# Patient Record
Sex: MALE | Race: BLACK OR AFRICAN AMERICAN | Employment: UNEMPLOYED | URBAN - METROPOLITAN AREA
[De-identification: names, ages, dates, MRNs, and addresses within clinical notes are randomized per-mention and may not be internally consistent; named-entity substitution may affect disease eponyms.]

---

## 2019-05-27 ENCOUNTER — HOSPITAL ENCOUNTER (EMERGENCY)
Age: 39
Discharge: HOME | End: 2019-05-28
Attending: EMERGENCY MEDICINE
Payer: COMMERCIAL

## 2019-05-27 ENCOUNTER — APPOINTMENT (OUTPATIENT)
Dept: CT IMAGING | Age: 39
End: 2019-05-27
Payer: COMMERCIAL

## 2019-05-27 DIAGNOSIS — K57.32 DIVERTICULITIS OF COLON: Primary | ICD-10-CM

## 2019-05-27 LAB
A/G RATIO: 1.2 (ref 1.1–2.2)
ALBUMIN SERPL-MCNC: 4 G/DL (ref 3.4–5)
ALP BLD-CCNC: 79 U/L (ref 40–129)
ALT SERPL-CCNC: 13 U/L (ref 10–40)
ANION GAP SERPL CALCULATED.3IONS-SCNC: 10 MMOL/L (ref 3–16)
AST SERPL-CCNC: 18 U/L (ref 15–37)
BASOPHILS ABSOLUTE: 0.1 K/UL (ref 0–0.2)
BASOPHILS RELATIVE PERCENT: 0.8 %
BILIRUB SERPL-MCNC: 0.8 MG/DL (ref 0–1)
BILIRUBIN URINE: ABNORMAL
BLOOD, URINE: ABNORMAL
BUN BLDV-MCNC: 10 MG/DL (ref 7–20)
CALCIUM SERPL-MCNC: 9.5 MG/DL (ref 8.3–10.6)
CHLORIDE BLD-SCNC: 99 MMOL/L (ref 99–110)
CLARITY: CLEAR
CO2: 25 MMOL/L (ref 21–32)
COLOR: ABNORMAL
CREAT SERPL-MCNC: 1 MG/DL (ref 0.9–1.3)
EOSINOPHILS ABSOLUTE: 0.1 K/UL (ref 0–0.6)
EOSINOPHILS RELATIVE PERCENT: 0.8 %
EPITHELIAL CELLS, UA: 1 /HPF (ref 0–5)
GFR AFRICAN AMERICAN: >60
GFR NON-AFRICAN AMERICAN: >60
GLOBULIN: 3.4 G/DL
GLUCOSE BLD-MCNC: 130 MG/DL (ref 70–99)
GLUCOSE URINE: NEGATIVE MG/DL
HCT VFR BLD CALC: 44.9 % (ref 40.5–52.5)
HEMOGLOBIN: 15.1 G/DL (ref 13.5–17.5)
HYALINE CASTS: 4 /LPF (ref 0–8)
KETONES, URINE: 15 MG/DL
LEUKOCYTE ESTERASE, URINE: NEGATIVE
LIPASE: 10 U/L (ref 13–60)
LYMPHOCYTES ABSOLUTE: 1.3 K/UL (ref 1–5.1)
LYMPHOCYTES RELATIVE PERCENT: 12 %
MCH RBC QN AUTO: 33.2 PG (ref 26–34)
MCHC RBC AUTO-ENTMCNC: 33.7 G/DL (ref 31–36)
MCV RBC AUTO: 98.5 FL (ref 80–100)
MICROSCOPIC EXAMINATION: YES
MONOCYTES ABSOLUTE: 0.8 K/UL (ref 0–1.3)
MONOCYTES RELATIVE PERCENT: 7.7 %
NEUTROPHILS ABSOLUTE: 8.7 K/UL (ref 1.7–7.7)
NEUTROPHILS RELATIVE PERCENT: 78.7 %
NITRITE, URINE: NEGATIVE
PDW BLD-RTO: 12.8 % (ref 12.4–15.4)
PH UA: 5.5 (ref 5–8)
PLATELET # BLD: 206 K/UL (ref 135–450)
PMV BLD AUTO: 7.9 FL (ref 5–10.5)
POTASSIUM SERPL-SCNC: 3.5 MMOL/L (ref 3.5–5.1)
PROTEIN UA: ABNORMAL MG/DL
RBC # BLD: 4.56 M/UL (ref 4.2–5.9)
RBC UA: 3 /HPF (ref 0–4)
SODIUM BLD-SCNC: 134 MMOL/L (ref 136–145)
SPECIFIC GRAVITY UA: >1.03 (ref 1–1.03)
TOTAL PROTEIN: 7.4 G/DL (ref 6.4–8.2)
URINE REFLEX TO CULTURE: ABNORMAL
URINE TYPE: ABNORMAL
UROBILINOGEN, URINE: 0.2 E.U./DL
WBC # BLD: 11 K/UL (ref 4–11)
WBC UA: 1 /HPF (ref 0–5)

## 2019-05-27 PROCEDURE — 81001 URINALYSIS AUTO W/SCOPE: CPT

## 2019-05-27 PROCEDURE — 74177 CT ABD & PELVIS W/CONTRAST: CPT

## 2019-05-27 PROCEDURE — 6360000004 HC RX CONTRAST MEDICATION: Performed by: PHYSICIAN ASSISTANT

## 2019-05-27 PROCEDURE — 96365 THER/PROPH/DIAG IV INF INIT: CPT

## 2019-05-27 PROCEDURE — 6360000002 HC RX W HCPCS: Performed by: PHYSICIAN ASSISTANT

## 2019-05-27 PROCEDURE — 83690 ASSAY OF LIPASE: CPT

## 2019-05-27 PROCEDURE — 96361 HYDRATE IV INFUSION ADD-ON: CPT

## 2019-05-27 PROCEDURE — 99284 EMERGENCY DEPT VISIT MOD MDM: CPT

## 2019-05-27 PROCEDURE — 85025 COMPLETE CBC W/AUTO DIFF WBC: CPT

## 2019-05-27 PROCEDURE — 96376 TX/PRO/DX INJ SAME DRUG ADON: CPT

## 2019-05-27 PROCEDURE — 80053 COMPREHEN METABOLIC PANEL: CPT

## 2019-05-27 PROCEDURE — 2580000003 HC RX 258: Performed by: PHYSICIAN ASSISTANT

## 2019-05-27 PROCEDURE — 96375 TX/PRO/DX INJ NEW DRUG ADDON: CPT

## 2019-05-27 PROCEDURE — 2500000003 HC RX 250 WO HCPCS: Performed by: PHYSICIAN ASSISTANT

## 2019-05-27 RX ORDER — CIPROFLOXACIN 500 MG/1
500 TABLET, FILM COATED ORAL 2 TIMES DAILY
Qty: 20 TABLET | Refills: 0 | Status: SHIPPED | OUTPATIENT
Start: 2019-05-27 | End: 2019-06-06

## 2019-05-27 RX ORDER — ONDANSETRON 4 MG/1
4-8 TABLET, ORALLY DISINTEGRATING ORAL EVERY 12 HOURS PRN
Qty: 12 TABLET | Refills: 0 | Status: SHIPPED | OUTPATIENT
Start: 2019-05-27 | End: 2020-11-17

## 2019-05-27 RX ORDER — MORPHINE SULFATE 4 MG/ML
4 INJECTION, SOLUTION INTRAMUSCULAR; INTRAVENOUS ONCE
Status: COMPLETED | OUTPATIENT
Start: 2019-05-27 | End: 2019-05-27

## 2019-05-27 RX ORDER — ONDANSETRON 2 MG/ML
4 INJECTION INTRAMUSCULAR; INTRAVENOUS ONCE
Status: COMPLETED | OUTPATIENT
Start: 2019-05-27 | End: 2019-05-27

## 2019-05-27 RX ORDER — OXYCODONE HYDROCHLORIDE AND ACETAMINOPHEN 5; 325 MG/1; MG/1
2 TABLET ORAL ONCE
Status: DISCONTINUED | OUTPATIENT
Start: 2019-05-27 | End: 2019-05-27

## 2019-05-27 RX ORDER — HYDROMORPHONE HYDROCHLORIDE 1 MG/ML
1 INJECTION, SOLUTION INTRAMUSCULAR; INTRAVENOUS; SUBCUTANEOUS ONCE
Status: COMPLETED | OUTPATIENT
Start: 2019-05-27 | End: 2019-05-27

## 2019-05-27 RX ORDER — 0.9 % SODIUM CHLORIDE 0.9 %
1000 INTRAVENOUS SOLUTION INTRAVENOUS ONCE
Status: COMPLETED | OUTPATIENT
Start: 2019-05-27 | End: 2019-05-27

## 2019-05-27 RX ORDER — ONDANSETRON 2 MG/ML
INJECTION INTRAMUSCULAR; INTRAVENOUS
Status: DISCONTINUED
Start: 2019-05-27 | End: 2019-05-28 | Stop reason: HOSPADM

## 2019-05-27 RX ORDER — METRONIDAZOLE 500 MG/1
500 TABLET ORAL 2 TIMES DAILY
Qty: 14 TABLET | Refills: 0 | Status: SHIPPED | OUTPATIENT
Start: 2019-05-27 | End: 2019-06-03

## 2019-05-27 RX ORDER — OXYCODONE HYDROCHLORIDE AND ACETAMINOPHEN 5; 325 MG/1; MG/1
1-2 TABLET ORAL EVERY 6 HOURS PRN
Qty: 15 TABLET | Refills: 0 | Status: SHIPPED | OUTPATIENT
Start: 2019-05-27 | End: 2019-05-30

## 2019-05-27 RX ORDER — CIPROFLOXACIN 2 MG/ML
400 INJECTION, SOLUTION INTRAVENOUS ONCE
Status: COMPLETED | OUTPATIENT
Start: 2019-05-27 | End: 2019-05-27

## 2019-05-27 RX ADMIN — HYDROMORPHONE HYDROCHLORIDE 1 MG: 1 INJECTION, SOLUTION INTRAMUSCULAR; INTRAVENOUS; SUBCUTANEOUS at 23:01

## 2019-05-27 RX ADMIN — MORPHINE SULFATE 4 MG: 4 INJECTION INTRAVENOUS at 21:29

## 2019-05-27 RX ADMIN — METRONIDAZOLE 500 MG: 500 INJECTION, SOLUTION INTRAVENOUS at 21:31

## 2019-05-27 RX ADMIN — ONDANSETRON 4 MG: 2 INJECTION INTRAMUSCULAR; INTRAVENOUS at 21:03

## 2019-05-27 RX ADMIN — MORPHINE SULFATE 4 MG: 4 INJECTION INTRAVENOUS at 20:03

## 2019-05-27 RX ADMIN — ONDANSETRON 4 MG: 2 INJECTION INTRAMUSCULAR; INTRAVENOUS at 20:01

## 2019-05-27 RX ADMIN — IOPAMIDOL 75 ML: 755 INJECTION, SOLUTION INTRAVENOUS at 20:25

## 2019-05-27 RX ADMIN — SODIUM CHLORIDE 1000 ML: 9 INJECTION, SOLUTION INTRAVENOUS at 20:00

## 2019-05-27 RX ADMIN — CIPROFLOXACIN 400 MG: 2 INJECTION, SOLUTION INTRAVENOUS at 22:37

## 2019-05-27 ASSESSMENT — ENCOUNTER SYMPTOMS
VOMITING: 0
COUGH: 0
WHEEZING: 0
ABDOMINAL PAIN: 1
RHINORRHEA: 0
DIARRHEA: 0
NAUSEA: 1
SHORTNESS OF BREATH: 0

## 2019-05-27 ASSESSMENT — PAIN SCALES - GENERAL
PAINLEVEL_OUTOF10: 10
PAINLEVEL_OUTOF10: 10
PAINLEVEL_OUTOF10: 9
PAINLEVEL_OUTOF10: 10

## 2019-05-27 ASSESSMENT — PAIN DESCRIPTION - PROGRESSION
CLINICAL_PROGRESSION: NOT CHANGED
CLINICAL_PROGRESSION: NOT CHANGED

## 2019-05-27 ASSESSMENT — PAIN DESCRIPTION - LOCATION
LOCATION: ABDOMEN

## 2019-05-27 ASSESSMENT — PAIN DESCRIPTION - PAIN TYPE
TYPE: ACUTE PAIN

## 2019-05-27 NOTE — ED PROVIDER NOTES
[address] [facility]        Pt Name: Stefania Merida  MRN: [id number]  [place] 1980  Date of evaluation: 5/27/2019  Provider: Nelsy Jasso PA-C  PCP: Referring Not In System (Inactive)    This patient was seen and evaluated by the attending physician Dr. Mona Pringle. CHIEF COMPLAINT       Chief Complaint   Patient presents with    Abdominal Pain     abd started yesterday, patient reporting that feels like something is in his stomach stabbing him       HISTORY OF PRESENT ILLNESS   (Location/Symptom, Timing/Onset, Context/Setting, Quality, Duration, Modifying Factors, Severity)  Note limiting factors. Stefania Merida is a 46 y.o. male presents for evaluation of generalized abdominal pain that began yesterday. Patient reports the pain is constant, severe denies any known aggravating or alleviating factors. He has had some is also associated nausea but denies any vomiting or diarrhea. No urinary complaints. No chest pain or shortness of breath. No fevers or chills. No known sick contacts with similar symptoms. No history of similar symptoms. No history of any abdominal surgeries. He has no other complaints or concerns at this time. Nursing Notes were all reviewed and agreed with or any disagreements were addressed  in the HPI. REVIEW OF SYSTEMS    (2-9 systems for level 4, 10 or more for level 5)     Review of Systems   Constitutional: Negative for appetite change, chills and fever. HENT: Negative for congestion and rhinorrhea. Respiratory: Negative for cough, shortness of breath and wheezing. Cardiovascular: Negative for chest pain. Gastrointestinal: Positive for abdominal pain and nausea. Negative for diarrhea and vomiting. Genitourinary: Negative for difficulty urinating, dysuria and hematuria. Musculoskeletal: Negative for neck pain and neck stiffness. Skin: Negative for rash. Neurological: Negative for headaches. Positives and Pertinent negatives as per HPI. Except as noted abovein the ROS, all other systems were reviewed and negative. PAST MEDICAL HISTORY     Past Medical History:   Diagnosis Date    Back pain     Neisseria gonorrheae 6/13/15         SURGICAL HISTORY   History reviewed. No pertinent surgical history. CURRENTMEDICATIONS       Previous Medications    ASPIRIN 325 MG TABLET    Take 325 mg by mouth daily    IBUPROFEN (ADVIL;MOTRIN) 600 MG TABLET    Take 1 tablet by mouth every 6 hours as needed for Pain    MELOXICAM (MOBIC) 7.5 MG TABLET    Take 1 tablet by mouth daily    NAPROXEN (NAPROSYN) 500 MG TABLET    Take 1 tablet by mouth 2 times daily (with meals)         ALLERGIES     Patient has no known allergies. FAMILYHISTORY     History reviewed. No pertinent family history.        SOCIAL HISTORY       Social History     Socioeconomic History    Marital status: Single     Spouse name: None    Number of children: None    Years of education: None    Highest education level: None   Occupational History    None   Social Needs    Financial resource strain: None    Food insecurity:     Worry: None     Inability: None    Transportation needs:     Medical: None     Non-medical: None   Tobacco Use    Smoking status: Current Every Day Smoker     Packs/day: 1.00     Types: Cigarettes    Smokeless tobacco: Never Used   Substance and Sexual Activity    Alcohol use: No     Comment: Rare    Drug use: No    Sexual activity: None   Lifestyle    Physical activity:     Days per week: None     Minutes per session: None    Stress: None   Relationships    Social connections:     Talks on phone: None     Gets together: None     Attends Mandaen service: None     Active member of club or organization: None     Attends meetings of clubs or organizations: None     Relationship status: None    Intimate partner violence:     Fear of current or ex partner: None     Emotionally abused: None Physically abused: None     Forced sexual activity: None   Other Topics Concern    None   Social History Narrative    ** Merged History Encounter **            SCREENINGS             PHYSICAL EXAM    (up to 7 for level 4, 8 or more for level 5)     ED Triage Vitals [05/27/19 1910]   BP Temp Temp Source Pulse Resp SpO2 Height Weight   118/73 98.8 °F (37.1 °C) Oral 99 16 100 % -- --       Physical Exam   Constitutional: He is oriented to person, place, and time. He appears well-developed and well-nourished. HENT:   Head: Normocephalic and atraumatic. Right Ear: External ear normal.   Left Ear: External ear normal.   Nose: Nose normal.   Eyes: Right eye exhibits no discharge. Left eye exhibits no discharge. Neck: Normal range of motion. Neck supple. Cardiovascular: Normal rate and regular rhythm. No murmur heard. Pulmonary/Chest: Effort normal and breath sounds normal. No stridor. No respiratory distress. He has no wheezes. Abdominal: Soft. He exhibits no distension and no mass. There is generalized tenderness and tenderness in the right upper quadrant, right lower quadrant and epigastric area. There is guarding. There is no rebound, no tenderness at McBurney's point and negative Mendoza's sign. No hernia. Musculoskeletal: Normal range of motion. Neurological: He is alert and oriented to person, place, and time. Skin: Skin is warm and dry. He is not diaphoretic. Psychiatric: He has a normal mood and affect. His behavior is normal.   Nursing note and vitals reviewed.       DIAGNOSTIC RESULTS   LABS:    Labs Reviewed   CBC WITH AUTO DIFFERENTIAL - Abnormal; Notable for the following components:       Result Value    Neutrophils # 8.7 (*)     All other components within normal limits    Narrative:     Performed at:  [facility] [address], [place], [facility] [address]   Phone [phone]   COMPREHENSIVE METABOLIC PANEL - Abnormal; Notable for the following components: abdomen and epigastrium. No peritoneal signs. No McBurney's point tenderness or Mendoza sign. He was given IV fluids, morphine and Zofran for symptomatically relief and will be reevaluated. On reevaluation, patient was having active vomiting and continued pain. Given second dose of morphine and Zofran. CBC and CMP are unremarkable. No leukocytosis. Lipase 10. Urinalysis is negative. CT abdomen and pelvis shows moderate to severe wall thickening of the proximal mid transverse colon with adjacent inflammatory fat stranding consistent with colonic diverticulitis without evidence of perforation, free air or abscess. Patient was given Cipro and Flagyl in the ED. On reevaluation, patient still complaining of pain but was requesting discharge home at this time. He is given Percocet, prescription for this, Zofran, Cipro and Flagyl. Encouraged follow-up with PCP for reevaluation in 2-3 days. I estimate there is LOW risk for ACUTE APPENDICITIS, BOWEL OBSTRUCTION, CHOLECYSTITIS, DIVERTICULITIS, INCARCERATED HERNIA, PANCREATITIS, or PERFORATED BOWEL or ULCER, thus I consider the discharge disposition reasonable. Also, there is no evidence or peritonitis, sepsis, or toxicity. Kendall Mar and I have discussed the diagnosis and risks, and we agree with discharging home to follow-up with their primary doctor. We also discussed returning to the Emergency Department immediately if new or worsening symptoms occur. We have discussed the symptoms which are most concerning (e.g., bloody stool, fever, changing or worsening pain, vomiting) that necessitate immediate return. FINAL IMPRESSION      1.  Diverticulitis of colon          DISPOSITION/PLAN   DISPOSITION Decision To Discharge 05/27/2019 10:50:00 PM      PATIENT REFERREDTO:  Referring Not In System    Call   For a re-check in  2-3  days    [facility] Emergency Department  [address] [facility]  [phone]  Go to   If symptoms

## 2019-05-27 NOTE — LETTER
[facility] Emergency Department  [address] [facility], [address]             May 28, 2019    Patient: Maame Stanton   YOB: 1980   Date of Visit: 5/27/2019       To Whom It May Concern:    Maame Stanton was seen and treated in our emergency department on 5/27/2019. He may return to work on Thursday 5/30/2019.       Sincerely,         [facility] Emergency Dept

## 2019-05-28 VITALS
TEMPERATURE: 98.8 F | HEART RATE: 86 BPM | OXYGEN SATURATION: 96 % | DIASTOLIC BLOOD PRESSURE: 77 MMHG | RESPIRATION RATE: 16 BRPM | SYSTOLIC BLOOD PRESSURE: 119 MMHG

## 2019-05-28 ASSESSMENT — PAIN DESCRIPTION - PAIN TYPE: TYPE: ACUTE PAIN

## 2019-05-28 ASSESSMENT — PAIN DESCRIPTION - LOCATION: LOCATION: ABDOMEN

## 2019-05-28 ASSESSMENT — PAIN DESCRIPTION - PROGRESSION: CLINICAL_PROGRESSION: NOT CHANGED

## 2019-05-28 ASSESSMENT — PAIN SCALES - GENERAL: PAINLEVEL_OUTOF10: 9

## 2019-05-28 NOTE — ED NOTES
Pt returned from CT - resting with lights dimmed in room. Call bell in reach.      Dennie Jericho, RN  05/27/19 2048

## 2019-05-28 NOTE — ED NOTES
Pt presents with abdominal discomfort - stated that it feels like someone stabbing him like gas pain all over x 1 day. Pt denies any constipation or dysuria. Denies any N/V. Respirations even and unlabored. Saline lock inserted with blood drawn and sent to lab. NS infusing IV with meds given for pain. Call bell in reach.      Amaya Burleson RN  05/27/19 2008

## 2019-05-28 NOTE — ED PROVIDER NOTES
[facility] Emergency Department      Pt Name: Melissa Day  MRN: [id number]  [place] 1980  Date of evaluation: 5/27/2019  Provider: Houston Mirza MD  I independently performed a history and physical on Melissa Day. All diagnostic, treatment, and disposition decisions were made by myself in conjunction with the advanced practice provider. HPI: Melissa Day presented with   Chief Complaint   Patient presents with    Abdominal Pain     abd started yesterday, patient reporting that feels like something is in his stomach stabbing him     Melissa Day has a past medical history of Back pain and Neisseria gonorrheae (6/13/15). He has no past surgical history on file. No current facility-administered medications on file prior to encounter. Current Outpatient Medications on File Prior to Encounter   Medication Sig Dispense Refill    naproxen (NAPROSYN) 500 MG tablet Take 1 tablet by mouth 2 times daily (with meals) 30 tablet 0    aspirin 325 MG tablet Take 325 mg by mouth daily      meloxicam (MOBIC) 7.5 MG tablet Take 1 tablet by mouth daily 30 tablet 0    ibuprofen (ADVIL;MOTRIN) 600 MG tablet Take 1 tablet by mouth every 6 hours as needed for Pain 30 tablet 0     PHYSICAL EXAM  Vitals: /69   Pulse 79   Temp 98.8 °F (37.1 °C) (Oral)   Resp 16   SpO2 97%   Constitutional:  46 y.o. male alert  HENT:  Atraumatic, oral mucosa moist  Neck:  No visible JVD, supple  Chest/Lungs:  Respiratory effort normal, clear, regular  Abdomen:  Non-distended, soft, upper tenderness, no r/g  Back:  No gross deformity  Extremities:  Normal tone and perfusion    Medical Decision Making and Plan: Briefly, this is an 46 y.o.male who presented with abdominal pain. The patient has findings consistent with diverticulitis. He is comfortable with the plan to take antibiotics at home.   Based on history, physical exam, and testing our suspicion is low for bowel or biliary obstruction, cholangitis, perforated viscous, appendicitis, gonad torsion, vascular occlusion or dissection, mesenteric ischemia, amongst other more emergent conditions. Ana Lilia Albrecht was given appropriate discharge instructions. Referral to follow up provider. For further details of [facility] Emergency Department encounter, please see documentation by advanced practice provider JOAN Almodovar.     Labs Reviewed   CBC WITH AUTO DIFFERENTIAL - Abnormal; Notable for the following components:       Result Value    Neutrophils # 8.7 (*)     All other components within normal limits    Narrative:     Performed at:  [facility] [address]  [place], [place]   Phone [phone]   COMPREHENSIVE METABOLIC PANEL - Abnormal; Notable for the following components:    Sodium 134 (*)     Glucose 130 (*)     All other components within normal limits    Narrative:     Performed at:  [facility] [address] [facility] [email or web address]   Phone [phone]   LIPASE - Abnormal; Notable for the following components:    Lipase 10.0 (*)     All other components within normal limits    Narrative:     Performed at:  [facility] [address]  [place], [place]   Phone [phone]   URINE RT REFLEX TO CULTURE - Abnormal; Notable for the following components:    Bilirubin Urine SMALL (*)     Ketones, Urine 15 (*)     Blood, Urine SMALL (*)     Protein, UA TRACE (*)     All other components within normal limits    Narrative:     Performed at:  [facility] [address] [employer], [place]   Phone [phone]   MICROSCOPIC URINALYSIS    Narrative:     Performed at:  [facility] [address] [facility],  [place], [place]   Phone [phone]     RADIOLOGY:     CT ABDOMEN PELVIS W IV CONTRAST Additional Contrast? None   Preliminary Result   Localized focus of moderate to severe wall thickening of the proximal to mid   transverse colon within right upper quadrant, with adjacent inflammatory fat   stranding, most likely represents acute colonic diverticulitis, without   evidence of perforation, free air, or abscess. A focal infectious or   inflammatory colitis are considered less likely. RECOMMENDATIONS:   Suggest appropriate clinical treatment, at least short-term CT abdomen/pelvis   follow-up in 6-8 weeks when the patient has improved, to assess for   resolution of this finding, as underlying colonic mass lesion cannot be   entirely excluded. Medications administered:  Medications   ciprofloxacin (CIPRO) IVPB 400 mg (400 mg Intravenous New Bag 5/27/19 2237)   0.9 % sodium chloride bolus (0 mLs Intravenous Stopped 5/27/19 2130)   morphine injection 4 mg (4 mg Intravenous Given 5/27/19 2003)   ondansetron (ZOFRAN) injection 4 mg (4 mg Intravenous Given 5/27/19 2001)   iopamidol (ISOVUE-370) 76 % injection 75 mL (75 mLs Intravenous Given 5/27/19 2025)   ondansetron (ZOFRAN) injection 4 mg (4 mg Intravenous Given 5/27/19 2103)   morphine injection 4 mg (4 mg Intravenous Given 5/27/19 2129)   metronidazole (FLAGYL) 500 mg in NaCl 100 mL IVPB premix (0 mg Intravenous Stopped 5/27/19 2232)   HYDROmorphone HCl PF (DILAUDID) injection 1 mg (1 mg Intravenous Given 5/27/19 2301)     New Prescriptions    CIPROFLOXACIN (CIPRO) 500 MG TABLET    Take 1 tablet by mouth 2 times daily for 10 days    METRONIDAZOLE (FLAGYL) 500 MG TABLET    Take 1 tablet by mouth 2 times daily for 7 days    ONDANSETRON (ZOFRAN ODT) 4 MG DISINTEGRATING TABLET    Take 1-2 tablets by mouth every 12 hours as needed for Nausea May Sub regular tablet (non-ODT) if insurance does not cover ODT. OXYCODONE-ACETAMINOPHEN (PERCOCET) 5-325 MG PER TABLET    Take 1-2 tablets by mouth every 6 hours as needed for Pain for up to 3 days. WARNING:  May cause drowsiness.   May impair ability to operate vehicles or machinery. Do not use in combination with alcohol. FOLLOW UP:    Referring Not In System    Call   For a re-check in  2-3  days    [facility] Emergency Department  [address]  Go to   If symptoms worsen    FINAL IMPRESSION:    1.  Diverticulitis of colon         Natalee Burgess MD  05/27/19 5832

## 2019-06-11 ENCOUNTER — HOSPITAL ENCOUNTER (EMERGENCY)
Age: 39
Discharge: HOME | End: 2019-06-11
Attending: EMERGENCY MEDICINE

## 2019-06-11 VITALS
RESPIRATION RATE: 12 BRPM | BODY MASS INDEX: 35.61 KG/M2 | WEIGHT: 235 LBS | DIASTOLIC BLOOD PRESSURE: 70 MMHG | OXYGEN SATURATION: 98 % | TEMPERATURE: 98 F | SYSTOLIC BLOOD PRESSURE: 122 MMHG | HEIGHT: 68 IN | HEART RATE: 81 BPM

## 2019-06-11 DIAGNOSIS — K60.2 ANAL FISSURE: Primary | ICD-10-CM

## 2019-06-11 PROCEDURE — 99282 EMERGENCY DEPT VISIT SF MDM: CPT

## 2019-06-11 RX ORDER — DOCUSATE SODIUM 100 MG/1
100 CAPSULE, LIQUID FILLED ORAL 2 TIMES DAILY
Qty: 60 CAPSULE | Refills: 0 | Status: SHIPPED | OUTPATIENT
Start: 2019-06-11 | End: 2019-07-11

## 2019-06-11 ASSESSMENT — ENCOUNTER SYMPTOMS
COUGH: 0
ABDOMINAL PAIN: 0
BACK PAIN: 0
DIARRHEA: 0
BLOOD IN STOOL: 0
SHORTNESS OF BREATH: 0
CONSTIPATION: 0
RESPIRATORY NEGATIVE: 1
ABDOMINAL DISTENTION: 0
ANAL BLEEDING: 1
COLOR CHANGE: 0
VOMITING: 0
RECTAL PAIN: 1
NAUSEA: 0

## 2019-06-11 NOTE — ED PROVIDER NOTES
[address] [facility]        Pt Name: Fadumo Hastings  MRN: [id number]  [place] 1980  Date of evaluation: 6/11/2019  Provider: JOAN Vazquez  PCP: Referring Not In System (Inactive)    This patient was not seen and evaluated by the attending physician but available for consultation as needed. CHIEF COMPLAINT       Chief Complaint   Patient presents with    Anal Itching     pt in with c/o pain and itching to rectum ongoing for over week- states sees blood when wipes        HISTORY OF PRESENT ILLNESS   (Location/Symptom, Timing/Onset, Context/Setting, Quality, Duration, Modifying Factors, Severity)  Note limiting factors. Fadumo Hastings is a 46 y.o. male with no significant past medical history who presents the ED with complaint of anal itching. Patient states is a burning sensation rated 9/10 and associated itching to his rectum. States ongoing for the past week. Patient states he has had large bowel movements over the past couple weeks. States pain worse with bowel movements. States he does have plane when he wipes. Denies any pain on the stool or in the toilet bowl. Denies injury or trauma. Denies receptive anal intercourse. Patient denies any foreign body insertion. Denies abdominal pain, nausea/vomiting, fever/chills, rashes/lesions, diarrhea, changes in bowel movements, urinary symptoms, distally pain/swelling, chest pain, shortness of breath or other associated symptoms. Patient states he is concerned he has a hemorrhoid. Denies history of hemorrhoids in the past.  Denies taking any over-the-counter medication for symptom control. Nursing Notes were all reviewed and agreed with or any disagreements were addressed  in the HPI. REVIEW OF SYSTEMS    (2-9 systems for level 4, 10 or more for level 5)     Review of Systems   Constitutional: Negative for chills and fever. Respiratory: Negative.   Negative for cough and shortness of breath. Cardiovascular: Negative. Negative for chest pain. Gastrointestinal: Positive for anal bleeding and rectal pain. Negative for abdominal distention, abdominal pain, blood in stool, constipation, diarrhea, nausea and vomiting. Genitourinary: Negative for decreased urine volume, difficulty urinating, dysuria, flank pain, frequency, hematuria, testicular pain and urgency. Musculoskeletal: Negative for arthralgias, back pain, myalgias, neck pain and neck stiffness. Skin: Negative for color change, pallor, rash and wound. Neurological: Negative for dizziness and light-headedness. Positives and Pertinent negatives as per HPI. Except as noted abovein the ROS, all other systems were reviewed and negative. PAST MEDICAL HISTORY     Past Medical History:   Diagnosis Date    Back pain     Neisseria gonorrheae 6/13/15         SURGICAL HISTORY   History reviewed. No pertinent surgical history. [address]       Discharge Medication List as of 6/11/2019  5:16 PM      CONTINUE these medications which have NOT CHANGED    Details   ondansetron (ZOFRAN ODT) 4 MG disintegrating tablet Take 1-2 tablets by mouth every 12 hours as needed for Nausea May Sub regular tablet (non-ODT) if insurance does not cover ODT., Disp-12 tablet, R-0Print      naproxen (NAPROSYN) 500 MG tablet Take 1 tablet by mouth 2 times daily (with meals), Disp-30 tablet, R-0Print      aspirin 325 MG tablet Take 325 mg by mouth daily      meloxicam (MOBIC) 7.5 MG tablet Take 1 tablet by mouth daily, Disp-30 tablet, R-0      ibuprofen (ADVIL;MOTRIN) 600 MG tablet Take 1 tablet by mouth every 6 hours as needed for Pain, Disp-30 tablet, R-0               ALLERGIES     Patient has no known allergies. FAMILYHISTORY     History reviewed. No pertinent family history.        SOCIAL HISTORY       Social History     Socioeconomic History    Marital status: Single     Spouse name: None    Number of children: None    Years of education: None    Highest education level: None   Occupational History    None   Social Needs    Financial resource strain: None    Food insecurity:     Worry: None     Inability: None    Transportation needs:     Medical: None     Non-medical: None   Tobacco Use    Smoking status: Current Every Day Smoker     Packs/day: 1.00     Types: Cigarettes    Smokeless tobacco: Never Used   Substance and Sexual Activity    Alcohol use: No     Comment: Rare    Drug use: No    Sexual activity: None   Lifestyle    Physical activity:     Days per week: None     Minutes per session: None    Stress: None   Relationships    Social connections:     Talks on phone: None     Gets together: None     Attends [employer] service: None     Active member of club or organization: None     Attends meetings of clubs or organizations: None     Relationship status: None    Intimate partner violence:     Fear of current or ex partner: None     Emotionally abused: None     Physically abused: None     Forced sexual activity: None   Other Topics Concern    None   Social History Narrative    ** Merged History Encounter **            SCREENINGS             PHYSICAL EXAM    (up to 7 for level 4, 8 or more for level 5)     ED Triage Vitals [06/11/19 1658]   BP Temp Temp Source Pulse Resp SpO2 Height Weight   122/70 98 °F (36.7 °C) Infrared 81 12 98 % 5' 8\" (1.727 m) 235 lb (106.6 kg)       Physical Exam   Constitutional: He is oriented to person, place, and time. He appears well-developed and well-nourished. HENT:   Head: Normocephalic and atraumatic. Right Ear: External ear normal.   Left Ear: External ear normal.   Eyes: Right eye exhibits no discharge. Left eye exhibits no discharge. Neck: Normal range of motion. Neck supple. Pulmonary/Chest: Effort normal. No stridor. No respiratory distress. Abdominal: Soft. Bowel sounds are normal. He exhibits no distension and no mass. There is no tenderness.  There is no rigidity, no rebound, no guarding, no CVA tenderness, no tenderness at McBurney's point and negative Mendoza's sign. Genitourinary: Rectal exam shows fissure and tenderness. Rectal exam shows no external hemorrhoid, no internal hemorrhoid and no mass. Musculoskeletal: Normal range of motion. Neurological: He is alert and oriented to person, place, and time. Skin: Skin is warm and dry. He is not diaphoretic. No erythema. No pallor. Psychiatric: He has a normal mood and affect. His behavior is normal.       DIAGNOSTIC RESULTS   LABS:    Labs Reviewed - No data to display    All other labs were within normal range or not returned as of this dictation. EKG: All EKG's are interpreted by the Emergency Department Physician who either signs orCo-signs this chart in the absence of a cardiologist.  Please see their note for interpretation of EKG. RADIOLOGY:   Non-plain film images such as CT, Ultrasound and MRI are read by the radiologist. Plain radiographic images are visualized andpreliminarily interpreted by the  ED Provider with the below findings:        Interpretation perthe Radiologist below, if available at the time of this note:    No orders to display     No results found. PROCEDURES   Unless otherwise noted below, none     Procedures    CRITICAL CARE TIME   N/A    CONSULTS:  None      EMERGENCY DEPARTMENT COURSE and DIFFERENTIALDIAGNOSIS/MDM:   Vitals:    Vitals:    06/11/19 1658   BP: 122/70   Pulse: 81   Resp: 12   Temp: 98 °F (36.7 °C)   TempSrc: Infrared   SpO2: 98%   Weight: 235 lb (106.6 kg)   Height: 5' 8\" (1.727 m)       Patient was given thefollowing medications:  Medications - No data to display    Patient is a 28-year-old male who presents the ED with complaint of rectal itching, rectal burning, rectal pain and blood with bowel movements and wiping. Ongoing for the past week. Denies history of similar symptoms in the past.  Upon examination patient's abdomen benign.   Rectal exam shows tenderness of the rectum with what appears to be an anal fissure. No significant hemorrhoid noted. Given history and physical examination patient likely some for rectal pain and what appears to be anal fissure. We will give Anusol and Colace. Follow-up with PCP. Return to ED for any worsening symptoms. Low suspicion for proctitis, surgical abdomen, colitis, diverticulitis, thrombosed external hemorrhoid or other emergent etiology at this time. FINAL IMPRESSION      1. Anal fissure          DISPOSITION/PLAN   DISPOSITION Decision To Discharge 06/11/2019 05:09:25 PM      PATIENT REFERREDTO:  [facility] Emergency Department  [address] [facility]  [address] [address] [facility]  Go to   As needed, If symptoms worsen    [facility]) Pre-Services  [phone]          DISCHARGE MEDICATIONS:  Discharge Medication List as of 6/11/2019  5:16 PM      START taking these medications    Details   hydrocortisone (ANUSOL-HC) 2.5 % rectal cream Place rectally 2 times daily. , Disp-28 g, R-0, Print      docusate sodium (COLACE) 100 MG capsule Take 1 capsule by mouth 2 times daily, Disp-60 capsule, R-0Print             DISCONTINUED MEDICATIONS:  Discharge Medication List as of 6/11/2019  5:16 PM                 (Please note that portions ofthis note were completed with a voice recognition program.  Efforts were made to edit the dictations but occasionally words are mis-transcribed.)    JOAN Harvey (electronically signed)          JOAN Bullock  06/11/19 8414

## 2020-05-20 ENCOUNTER — HOSPITAL ENCOUNTER (EMERGENCY)
Age: 40
Discharge: HOME | End: 2020-05-21
Attending: EMERGENCY MEDICINE
Payer: COMMERCIAL

## 2020-05-20 VITALS
DIASTOLIC BLOOD PRESSURE: 95 MMHG | TEMPERATURE: 97 F | SYSTOLIC BLOOD PRESSURE: 188 MMHG | OXYGEN SATURATION: 98 % | RESPIRATION RATE: 16 BRPM | HEART RATE: 98 BPM

## 2020-05-20 PROCEDURE — 99283 EMERGENCY DEPT VISIT LOW MDM: CPT

## 2020-05-20 ASSESSMENT — PAIN SCALES - GENERAL: PAINLEVEL_OUTOF10: 10

## 2020-05-21 PROCEDURE — 6360000002 HC RX W HCPCS: Performed by: PHYSICIAN ASSISTANT

## 2020-05-21 PROCEDURE — 96372 THER/PROPH/DIAG INJ SC/IM: CPT

## 2020-05-21 PROCEDURE — 6370000000 HC RX 637 (ALT 250 FOR IP): Performed by: PHYSICIAN ASSISTANT

## 2020-05-21 RX ORDER — HYDROCODONE BITARTRATE AND ACETAMINOPHEN 5; 325 MG/1; MG/1
1 TABLET ORAL EVERY 6 HOURS PRN
Qty: 10 TABLET | Refills: 0 | Status: SHIPPED | OUTPATIENT
Start: 2020-05-21 | End: 2020-05-24

## 2020-05-21 RX ORDER — OXYCODONE HYDROCHLORIDE AND ACETAMINOPHEN 5; 325 MG/1; MG/1
2 TABLET ORAL ONCE
Status: COMPLETED | OUTPATIENT
Start: 2020-05-21 | End: 2020-05-21

## 2020-05-21 RX ORDER — ORPHENADRINE CITRATE 30 MG/ML
60 INJECTION INTRAMUSCULAR; INTRAVENOUS ONCE
Status: COMPLETED | OUTPATIENT
Start: 2020-05-21 | End: 2020-05-21

## 2020-05-21 RX ORDER — PREDNISONE 20 MG/1
60 TABLET ORAL ONCE
Status: DISCONTINUED | OUTPATIENT
Start: 2020-05-21 | End: 2020-05-21 | Stop reason: HOSPADM

## 2020-05-21 RX ORDER — KETOROLAC TROMETHAMINE 30 MG/ML
60 INJECTION, SOLUTION INTRAMUSCULAR; INTRAVENOUS ONCE
Status: COMPLETED | OUTPATIENT
Start: 2020-05-21 | End: 2020-05-21

## 2020-05-21 RX ORDER — PREDNISONE 10 MG/1
60 TABLET ORAL DAILY
Qty: 30 TABLET | Refills: 0 | Status: SHIPPED | OUTPATIENT
Start: 2020-05-21 | End: 2020-05-26

## 2020-05-21 RX ADMIN — KETOROLAC TROMETHAMINE 60 MG: 30 INJECTION, SOLUTION INTRAMUSCULAR at 00:38

## 2020-05-21 RX ADMIN — OXYCODONE HYDROCHLORIDE AND ACETAMINOPHEN 2 TABLET: 5; 325 TABLET ORAL at 00:38

## 2020-05-21 RX ADMIN — ORPHENADRINE CITRATE 60 MG: 30 INJECTION INTRAMUSCULAR; INTRAVENOUS at 00:39

## 2020-05-21 ASSESSMENT — PAIN SCALES - GENERAL: PAINLEVEL_OUTOF10: 10

## 2020-05-21 ASSESSMENT — ENCOUNTER SYMPTOMS
NAUSEA: 0
BACK PAIN: 1
VOMITING: 0

## 2020-05-21 NOTE — ED PROVIDER NOTES
[facility]        Pt Name: Mani Pittman  MRN: [id number]  [place] 1980  Date of evaluation: 5/20/2020  Provider: Melinda Perez PA-C  PCP: Referring Not In System (Inactive)     I have seen and evaluated this patient with my supervising physician Surya Jesus MD.    [address] [facility]       Chief Complaint   Patient presents with    Back Pain     pt with severe back pain that radiates down his legs that started saturday and got worse. HISTORY OF PRESENT ILLNESS   (Location, Timing/Onset, Context/Setting, Quality, Duration, Modifying Factors, Severity, Associated Signs and Symptoms)  Note limiting factors. Mani Pittman is a 45 y.o. male who presents to the emergency department today for evaluation for back pain. The patient states that since Saturday he has been experiencing pain to both sides of his lower back, right is worse than the left, he states that his back pain has since radiated down his right leg. The patient states that his pain is sharp, constant and is rated as a 10/10, he denies falling or injuring himself in any way. He tells me that his pain is worse with movement, and improves with certain positions. He denies any bowel or bladder incontinence or retention. He has no saddle anesthesias. He has no recent injections into the back. He has no abdominal pain, no fevers. He has no nausea or vomiting. He has no urinary symptoms. Patient has a history of degenerative disc disease, as well as herniated disks, he also has had lumbar radiculopathy in the past.  The patient feels that his symptoms today are likely an exacerbation of his back issues that he has had in the past.  The patient was seen in the emergency room at [facility] on 5/19/2020, and he states that his MRI was performed at that time. He was given Lidoderm patches and muscle relaxers for home but was not given any pain medications.     Nursing Notes were all reviewed and agreed with or any disagreements were addressed in the HPI. REVIEW OF SYSTEMS    (2-9 systems for level 4, 10 or more for level 5)     Review of Systems   Constitutional: Negative for activity change, appetite change and fever. Gastrointestinal: Negative for nausea and vomiting. Musculoskeletal: Positive for back pain. Negative for arthralgias and gait problem. Skin: Negative for wound. Neurological: Negative for weakness and numbness. Positives and Pertinent negatives as per HPI. Except as noted above in the ROS, all other systems were reviewed and negative. PAST MEDICAL HISTORY     Past Medical History:   Diagnosis Date    Back pain     Neisseria gonorrheae 6/13/15         SURGICAL HISTORY   History reviewed. No pertinent surgical history. CURRENTMEDICATIONS       Previous Medications    ASPIRIN 325 MG TABLET    Take 325 mg by mouth daily    HYDROCORTISONE (ANUSOL-HC) 2.5 % RECTAL CREAM    Place rectally 2 times daily. IBUPROFEN (ADVIL;MOTRIN) 600 MG TABLET    Take 1 tablet by mouth every 6 hours as needed for Pain    MELOXICAM (MOBIC) 7.5 MG TABLET    Take 1 tablet by mouth daily    NAPROXEN (NAPROSYN) 500 MG TABLET    Take 1 tablet by mouth 2 times daily (with meals)    ONDANSETRON (ZOFRAN ODT) 4 MG DISINTEGRATING TABLET    Take 1-2 tablets by mouth every 12 hours as needed for Nausea May Sub regular tablet (non-ODT) if insurance does not cover ODT. ALLERGIES     Patient has no known allergies. FAMILYHISTORY     History reviewed. No pertinent family history.        SOCIAL HISTORY       Social History     Tobacco Use    Smoking status: Current Every Day Smoker     Packs/day: 1.00     Types: Cigarettes    Smokeless tobacco: Never Used   Substance Use Topics    Alcohol use: No     Comment: Rare    Drug use: No       SCREENINGS             PHYSICAL EXAM    (up to 7 for level 4, 8 or more for level 5)     ED Triage Vitals   BP Temp Temp Source Pulse Resp SpO2 Height Weight   05/20/20 2338 05/20/20 2332 05/20/20 2332 05/20/20 2338 05/20/20 2332 05/20/20 2332 -- --   (!) 188/95 97 °F (36.1 °C) Oral 98 16 98 %         Physical Exam  Vitals signs and nursing note reviewed. Constitutional:       Appearance: He is well-developed. He is not diaphoretic. HENT:      Head: Normocephalic and atraumatic. Right Ear: External ear normal.      Left Ear: External ear normal.      Nose: Nose normal.   Eyes:      General:         Right eye: No discharge. Left eye: No discharge. Neck:      Musculoskeletal: Normal range of motion and neck supple. Trachea: No tracheal deviation. Cardiovascular:      Pulses: Normal pulses. Pulmonary:      Effort: Pulmonary effort is normal. No respiratory distress. Musculoskeletal: Normal range of motion. Comments: There is diffuse tenderness across the lower lumbar spine, increasing over the right paraspinous muscles. No significant midline tenderness. No bony step-offs or crepitus. Skin:     General: Skin is warm and dry. Neurological:      Mental Status: He is alert and oriented to person, place, and time. Comments: Motor strength of bilateral lower extremities is 5/5 throughout. Normal sensation light touch. Patellar reflexes are 2+ bilaterally. Posterior tibialis pulse and dorsalis pedis pulses are 2+ bilaterally. Negative straight leg raise. Normal dorsiflexion and plantar flexion. Full range of motion of hip, knee and ankle joints. Psychiatric:         Behavior: Behavior normal.         DIAGNOSTIC RESULTS   LABS:    Labs Reviewed - No data to display    All other labs were within normal range or not returned as of this dictation. EKG: All EKG's are interpreted by the Emergency Department Physician in the absence of a cardiologist.  Please see their note for interpretation of EKG.       RADIOLOGY:   Non-plain film images such as CT, Ultrasound and MRI are read by the MD  [address]  [phone]    In 1 day        DISCHARGE MEDICATIONS:  New Prescriptions    HYDROCODONE-ACETAMINOPHEN (NORCO) 5-325 MG PER TABLET    Take 1 tablet by mouth every 6 hours as needed for Pain for up to 3 days.     PREDNISONE (DELTASONE) 10 MG TABLET    Take 6 tablets by mouth daily for 5 doses       DISCONTINUED MEDICATIONS:  Discontinued Medications    No medications on file              (Please note that portions of this note were completed with a voice recognition program.  Efforts were made to edit the dictations but occasionally words are mis-transcribed.)    Moy Christianson PA-C (electronically signed)           Moy Christianson PA-C  05/21/20 0246

## 2020-06-04 ENCOUNTER — HOSPITAL ENCOUNTER (OUTPATIENT)
Age: 40
Discharge: HOME | End: 2020-06-04
Payer: COMMERCIAL

## 2020-06-04 ENCOUNTER — HOSPITAL ENCOUNTER (OUTPATIENT)
Dept: GENERAL RADIOLOGY | Age: 40
Discharge: HOME | End: 2020-06-04
Payer: COMMERCIAL

## 2020-06-04 PROCEDURE — 72110 X-RAY EXAM L-2 SPINE 4/>VWS: CPT

## 2020-10-05 ENCOUNTER — HOSPITAL ENCOUNTER (EMERGENCY)
Age: 40
Discharge: HOME | End: 2020-10-05
Attending: STUDENT IN AN ORGANIZED HEALTH CARE EDUCATION/TRAINING PROGRAM
Payer: COMMERCIAL

## 2020-10-05 ENCOUNTER — APPOINTMENT (OUTPATIENT)
Dept: GENERAL RADIOLOGY | Age: 40
End: 2020-10-05

## 2020-10-05 VITALS
DIASTOLIC BLOOD PRESSURE: 74 MMHG | RESPIRATION RATE: 16 BRPM | SYSTOLIC BLOOD PRESSURE: 114 MMHG | OXYGEN SATURATION: 98 % | TEMPERATURE: 97.8 F | HEART RATE: 84 BPM

## 2020-10-05 LAB
ANION GAP SERPL CALCULATED.3IONS-SCNC: 11 MMOL/L (ref 3–16)
BASOPHILS ABSOLUTE: 0 K/UL (ref 0–0.2)
BASOPHILS RELATIVE PERCENT: 0 %
BUN BLDV-MCNC: 15 MG/DL (ref 7–20)
CALCIUM SERPL-MCNC: 9.2 MG/DL (ref 8.3–10.6)
CHLORIDE BLD-SCNC: 106 MMOL/L (ref 99–110)
CO2: 23 MMOL/L (ref 21–32)
CREAT SERPL-MCNC: 0.9 MG/DL (ref 0.9–1.3)
EKG ATRIAL RATE: 94 BPM
EKG DIAGNOSIS: NORMAL
EKG P AXIS: 62 DEGREES
EKG P-R INTERVAL: 166 MS
EKG Q-T INTERVAL: 354 MS
EKG QRS DURATION: 80 MS
EKG QTC CALCULATION (BAZETT): 442 MS
EKG R AXIS: 59 DEGREES
EKG T AXIS: 21 DEGREES
EKG VENTRICULAR RATE: 94 BPM
EOSINOPHILS ABSOLUTE: 0.3 K/UL (ref 0–0.6)
EOSINOPHILS RELATIVE PERCENT: 4 %
GFR AFRICAN AMERICAN: >60
GFR NON-AFRICAN AMERICAN: >60
GLUCOSE BLD-MCNC: 127 MG/DL (ref 70–99)
HCT VFR BLD CALC: 44.8 % (ref 40.5–52.5)
HEMOGLOBIN: 14.9 G/DL (ref 13.5–17.5)
LYMPHOCYTES ABSOLUTE: 3 K/UL (ref 1–5.1)
LYMPHOCYTES RELATIVE PERCENT: 45 %
MCH RBC QN AUTO: 32.4 PG (ref 26–34)
MCHC RBC AUTO-ENTMCNC: 33.3 G/DL (ref 31–36)
MCV RBC AUTO: 97.2 FL (ref 80–100)
MONOCYTES ABSOLUTE: 0.5 K/UL (ref 0–1.3)
MONOCYTES RELATIVE PERCENT: 7 %
NEUTROPHILS ABSOLUTE: 2.9 K/UL (ref 1.7–7.7)
NEUTROPHILS RELATIVE PERCENT: 44 %
PDW BLD-RTO: 12.7 % (ref 12.4–15.4)
PLATELET # BLD: 257 K/UL (ref 135–450)
PLATELET SLIDE REVIEW: ADEQUATE
PMV BLD AUTO: 7.3 FL (ref 5–10.5)
POTASSIUM REFLEX MAGNESIUM: 3.8 MMOL/L (ref 3.5–5.1)
PRO-BNP: 8 PG/ML (ref 0–124)
RBC # BLD: 4.61 M/UL (ref 4.2–5.9)
RBC # BLD: NORMAL 10*6/UL
SLIDE REVIEW: NORMAL
SODIUM BLD-SCNC: 140 MMOL/L (ref 136–145)
TROPONIN: <0.01 NG/ML
WBC # BLD: 6.7 K/UL (ref 4–11)

## 2020-10-05 PROCEDURE — 84484 ASSAY OF TROPONIN QUANT: CPT

## 2020-10-05 PROCEDURE — 71046 X-RAY EXAM CHEST 2 VIEWS: CPT

## 2020-10-05 PROCEDURE — 6370000000 HC RX 637 (ALT 250 FOR IP): Performed by: STUDENT IN AN ORGANIZED HEALTH CARE EDUCATION/TRAINING PROGRAM

## 2020-10-05 PROCEDURE — 85025 COMPLETE CBC W/AUTO DIFF WBC: CPT

## 2020-10-05 PROCEDURE — 93005 ELECTROCARDIOGRAM TRACING: CPT | Performed by: STUDENT IN AN ORGANIZED HEALTH CARE EDUCATION/TRAINING PROGRAM

## 2020-10-05 PROCEDURE — 83880 ASSAY OF NATRIURETIC PEPTIDE: CPT

## 2020-10-05 PROCEDURE — 93010 ELECTROCARDIOGRAM REPORT: CPT | Performed by: INTERNAL MEDICINE

## 2020-10-05 PROCEDURE — 80048 BASIC METABOLIC PNL TOTAL CA: CPT

## 2020-10-05 PROCEDURE — 99285 EMERGENCY DEPT VISIT HI MDM: CPT

## 2020-10-05 RX ORDER — ACETAMINOPHEN 500 MG
1000 TABLET ORAL ONCE
Status: COMPLETED | OUTPATIENT
Start: 2020-10-05 | End: 2020-10-05

## 2020-10-05 RX ORDER — IBUPROFEN 400 MG/1
400 TABLET ORAL ONCE
Status: COMPLETED | OUTPATIENT
Start: 2020-10-05 | End: 2020-10-05

## 2020-10-05 RX ADMIN — ACETAMINOPHEN 1000 MG: 500 TABLET ORAL at 13:24

## 2020-10-05 RX ADMIN — IBUPROFEN 400 MG: 400 TABLET, FILM COATED ORAL at 13:24

## 2020-10-05 ASSESSMENT — PAIN DESCRIPTION - PROGRESSION
CLINICAL_PROGRESSION: NOT CHANGED

## 2020-10-05 ASSESSMENT — PAIN SCALES - GENERAL
PAINLEVEL_OUTOF10: 8
PAINLEVEL_OUTOF10: 7
PAINLEVEL_OUTOF10: 6

## 2020-10-05 ASSESSMENT — PAIN DESCRIPTION - ORIENTATION
ORIENTATION: MID

## 2020-10-05 ASSESSMENT — PAIN DESCRIPTION - FREQUENCY
FREQUENCY: INTERMITTENT

## 2020-10-05 ASSESSMENT — PAIN DESCRIPTION - ONSET
ONSET: ON-GOING

## 2020-10-05 ASSESSMENT — PAIN DESCRIPTION - DESCRIPTORS
DESCRIPTORS: SHARP

## 2020-10-05 ASSESSMENT — PAIN DESCRIPTION - LOCATION
LOCATION: CHEST
LOCATION: BACK
LOCATION: CHEST;BACK

## 2020-10-05 ASSESSMENT — PAIN DESCRIPTION - PAIN TYPE
TYPE: ACUTE PAIN

## 2020-10-05 NOTE — ED TRIAGE NOTES
Pt to ED after being seen at urgent care for covid testing. Pt states he wanted to be tested r/t fatigue and \"not feeling well\". Pt had covid test nasally and began to cough after test.  Pt sent here by urgent care for eval of cough. Pt now in room resting comfortably. No cough.

## 2020-10-06 NOTE — ED PROVIDER NOTES
[facility] [facility]      Pt Name: Chasity Ortega  MRN: [id number]  [place] 1980  Date of evaluation: 10/5/2020  Provider: Louise Williamson MD    CHIEF COMPLAINT       Chief Complaint   Patient presents with    Shortness of Breath     x 1 week    Chest Pain    Abdominal Pain     x a few days         HISTORY OF PRESENT ILLNESS   (Location/Symptom, Timing/Onset,Context/Setting, Quality, Duration, Modifying Factors, Severity)  Note limiting factors. Chasity Ortega is a 45 y.o. male who presents to the emergency department c/o chest pain and shortness of breath x 1 week. Onset gradual, progressively worse, associated with nasal congestion, body aches and chills. Chest pain described as sharp, moderate in severity, worse with inspiration, worse with coughing. Not exertional.  Further associated with cough, described as nonproductive, symptoms not otherwise alleviated or exacerbated by other factors. Referred by urgent care out of concern for potential coronavirus, after they swabbed him there he had a coughing fit that did not resolve until he was evaluated in triage. NursingNotes were reviewed. REVIEW OF SYSTEMS    (2-9 systems for level 4, 10 or more for level 5)       Constitutional: No fever or chills. Eye: No visual disturbances. No eye pain. Ear/Nose/Mouth/Throat: Positive nasal congestion. No sore throat. Respiratory: Positive cough, positive shortness of breath, No sputum production. Cardiovascular: Positive chest pain. No palpitations. Gastrointestinal: No abdominal pain. No nausea or vomiting  Genitourinary: No dysuria. No hematuria. Hematology/Lymphatics: No bleeding or bruising tendency. Immunologic: No malaise. No swollen glands. Musculoskeletal: No back pain. No joint pain. Integumentary: No rash. No abrasions. Neurologic: No headache. No focal numbness or weakness.       PAST MEDICAL HISTORY     Past Medical History: Diagnosis Date    Back pain     Neisseria gonorrheae 6/13/15         SURGICALHISTORY     No past surgical history on file. CURRENT MEDICATIONS       Discharge Medication List as of 10/5/2020  1:24 PM      CONTINUE these medications which have NOT CHANGED    Details   hydrocortisone (ANUSOL-HC) 2.5 % rectal cream Place rectally 2 times daily. , Disp-28 g, R-0, Print      ondansetron (ZOFRAN ODT) 4 MG disintegrating tablet Take 1-2 tablets by mouth every 12 hours as needed for Nausea May Sub regular tablet (non-ODT) if insurance does not cover ODT., Disp-12 tablet, R-0Print      naproxen (NAPROSYN) 500 MG tablet Take 1 tablet by mouth 2 times daily (with meals), Disp-30 tablet, R-0Print      aspirin 325 MG tablet Take 325 mg by mouth daily      meloxicam (MOBIC) 7.5 MG tablet Take 1 tablet by mouth daily, Disp-30 tablet, R-0      ibuprofen (ADVIL;MOTRIN) 600 MG tablet Take 1 tablet by mouth every 6 hours as needed for Pain, Disp-30 tablet, R-0             ALLERGIES     Patient has no known allergies. FAMILY HISTORY     No family history on file.        SOCIAL HISTORY       Social History     Socioeconomic History    Marital status: Single     Spouse name: Not on file    Number of children: Not on file    Years of education: Not on file    Highest education level: Not on file   Occupational History    Not on file   Social Needs    Financial resource strain: Not on file    Food insecurity     Worry: Not on file     Inability: Not on file    Transportation needs     Medical: Not on file     Non-medical: Not on file   Tobacco Use    Smoking status: Current Every Day Smoker     Packs/day: 1.00     Types: Cigarettes    Smokeless tobacco: Never Used   Substance and Sexual Activity    Alcohol use: No     Comment: Rare    Drug use: No    Sexual activity: Not on file   Lifestyle    Physical activity     Days per week: Not on file     Minutes per session: Not on file    Stress: Not on file Relationships    Social connections     Talks on phone: Not on file     Gets together: Not on file     Attends Restoration service: Not on file     Active member of club or organization: Not on file     Attends meetings of clubs or organizations: Not on file     Relationship status: Not on file    Intimate partner violence     Fear of current or ex partner: Not on file     Emotionally abused: Not on file     Physically abused: Not on file     Forced sexual activity: Not on file   Other Topics Concern    Not on file   Social History Narrative    ** Merged History Encounter **            SCREENINGS             PHYSICAL EXAM    (up to 7 for level 4, 8 or more for level 5)     ED Triage Vitals [10/05/20 1146]   BP Temp Temp Source Pulse Resp SpO2 Height Weight   (!) 93/58 97.5 °F (36.4 °C) Tympanic 98 20 99 % -- --       General: Alert and oriented appropriately for age, No acute distress. Eye: Normal conjunctiva. Pupils equal and reactive. HENT: Oral mucosa is moist.  Positive nasal congestion. Respiratory: Respirations even and non-labored. Lungs clear to auscultation bilaterally. Cardiovascular: Normal rate, Regular rhythm. Intact peripheral pulses throughout. Gastrointestinal: Soft, Non-tender, Non-distended. Musculoskeletal: No swelling. Integumentary: Warm, Dry. Neurologic: Alert and appropriate for age. GCS 15, strength 5 out of 5 throughout, no focal deficits. Steady gait. Psychiatric: Cooperative. DIAGNOSTIC RESULTS     EKG: All EKG's are interpreted by the Emergency Department Physician who either signs or Co-signsthis chart in the absence of a cardiologist.    The Ekg interpreted by me shows  normal sinus rhythm with a rate of 94 bpm  Axis is   Normal  QTc is  normal  Intervals and Durations are unremarkable. ST Segments: normal, isolated T wave inversion in lead III which is a normal variant. No prior EKG available for comparison.           RADIOLOGY:   Non-plain filmimages such as CT, Ultrasound and MRI are read by the radiologist. Plain radiographic images are visualized and preliminarily interpreted by the emergency physician with the below findings:      Interpretation per the Radiologist below, if available at the time ofthis note:    XR CHEST (2 VW)   Final Result   No radiographic evidence of acute pulmonary disease. ED BEDSIDE ULTRASOUND:   Performed by ED Physician - none    LABS:  Labs Reviewed   BASIC METABOLIC PANEL W/ REFLEX TO MG FOR LOW K - Abnormal; Notable for the following components:       Result Value    Glucose 127 (*)     All other components within normal limits    Narrative:     Performed at:  [facility]  [address] [facility] [place] [address]   Phone [phone]   CBC WITH AUTO DIFFERENTIAL    Narrative:     Performed at:  [address] [place] [address]   Phone [phone]   BRAIN NATRIURETIC PEPTIDE    Narrative:     Performed at:  [address] [address]   Phone [phone]   TROPONIN    Narrative:     Performed at:  [facility]  [facility] [address] [place] [address]   Phone [phone]       All other labs were within normal range or not returned as of this dictation.     EMERGENCY DEPARTMENT COURSE and DIFFERENTIAL DIAGNOSIS/MDM:   Vitals:    Vitals:    10/05/20 1146 10/05/20 1151 10/05/20 1324 10/05/20 1345   BP: (!) 93/58 111/72  114/74   Pulse: 98   84   Resp: 20   16   Temp: 97.5 °F (36.4 °C)   97.8 °F (36.6 °C)   TempSrc: Tympanic   Oral   SpO2: 99%  97% 98%         Medical decision makin-year-old otherwise healthy male who presents to the emergency department complaining of pleuritic type chest pain, shortness of breath for the past week, cough, congestion, no objective respiratory distress on exam, lungs clear to auscultation bilaterally, likely viral upper respiratory infection, given ibuprofen and acetaminophen to improvement in patient's symptoms. EKG unremarkable for acute ischemic features, not tachycardic, PERC rule negative for PE. Not described as anginal chest pain. Heart Score for chest pain patients:  History:  [x] Slightly suspicious 0  [] Moderately suspicious +1  [] Highly suspicious +2    · Middle or left-sided  · Heaviness  · Initiated by exertion, emotion, or cold  · Radiation  · Relief of symptoms with Nitroglycerin      0 = 0 of the risk factors  +1 = 1 or 2 of the risk factors  +2 = 3 or more of risk factors    EKG:  [x] Normal 0  [] Non-specific disturbance +1  [] Significant ST deviation +2  Age:  [x] <45 = 0  [] 45-65 = +1  [] >65 = +2  Risk factors:  DM, Hyperlipidemia, HTN, Obesity, CAD, HIV, Smoking, Cocaine, Family History,   [x] No risk factors 0  [x] 1 or 2 risk factors +1, smoking  [] 3+ risk factors +2  Troponin:  [x] Normal 0  [] 1-3X normal +1  [] >3X normal +2  HEART SCORE TOTAL = 1    Scores 0-3: 0.9-1.7% risk of adverse cardiac event. Scores 4-6: 12-16.6% risk of adverse cardiac event. Scores ? 7: 50-65% risk of adverse cardiac event. A MACE (Major Adverse Cardiac Event) was defined as all-cause mortality, myocardial infarction, or coronary revascularization. Low risk for PE and PERC criteria negative:    Age 48 or less   or less  SpO2 on RA 95% or greater  No Hx of VTE  No trauma or surgery within 4 weeks  No hemoptysis  No exogenous estrogen  No unilateral leg swelling. Given these findings, there is a less than a 2% risk that Diallo Reddy has a PE and no further work-up is needed in conjunction with my shared decision making discussion with Diallo Reddy.       Discussed with patient negative findings, encourage patient to follow-up closely with his primary care provider, patient voiced understanding, discharged home, ambulated steadily from the emergency department, no respiratory distress or increased work of breathing with ambulation, never oxygen requirement in the emergency department, given COVID discharge instructions, quarantine instructions, encouraged follow-up with his COVID test results, return precautions and voiced understanding prior to departure. Medications   ibuprofen (ADVIL;MOTRIN) tablet 400 mg (400 mg Oral Given 10/5/20 1324)   acetaminophen (TYLENOL) tablet 1,000 mg (1,000 mg Oral Given 10/5/20 1324)           FINAL IMPRESSION      1. Atypical chest pain    2.  Viral upper respiratory illness          DISPOSITION/PLAN   DISPOSITION Decision To Discharge 10/05/2020 01:48:36 PM      PATIENT REFERRED TO:  Susi Alva  [phone]          DISCHARGE MEDICATIONS:  Discharge Medication List as of 10/5/2020  1:24 PM             (Please note that portions of this note were completed with a voice recognition program.Efforts were made to edit the dictations but occasionally words are mis-transcribed.)    Audra Spaulding MD (electronically signed)  Attending Emergency Physician          Audra Spaulding MD  10/07/20 0927

## 2020-11-09 ENCOUNTER — TELEPHONE (OUTPATIENT)
Dept: PULMONOLOGY | Age: 40
End: 2020-11-09

## 2020-11-17 ENCOUNTER — OFFICE VISIT (OUTPATIENT)
Dept: PULMONOLOGY | Age: 40
End: 2020-11-17
Payer: COMMERCIAL

## 2020-11-17 VITALS — SYSTOLIC BLOOD PRESSURE: 138 MMHG | OXYGEN SATURATION: 98 % | DIASTOLIC BLOOD PRESSURE: 88 MMHG | HEART RATE: 79 BPM

## 2020-11-17 PROBLEM — F17.200 CURRENT SMOKER: Status: ACTIVE | Noted: 2020-11-17

## 2020-11-17 PROBLEM — R06.02 SHORTNESS OF BREATH: Status: ACTIVE | Noted: 2020-11-17

## 2020-11-17 PROBLEM — K21.9 GASTROESOPHAGEAL REFLUX DISEASE WITHOUT ESOPHAGITIS: Status: ACTIVE | Noted: 2020-11-17

## 2020-11-17 PROBLEM — R05.3 CHRONIC COUGH: Status: ACTIVE | Noted: 2020-11-17

## 2020-11-17 PROBLEM — R07.89 MUSCULOSKELETAL CHEST PAIN: Status: ACTIVE | Noted: 2020-11-17

## 2020-11-17 PROCEDURE — 4004F PT TOBACCO SCREEN RCVD TLK: CPT | Performed by: INTERNAL MEDICINE

## 2020-11-17 PROCEDURE — G8421 BMI NOT CALCULATED: HCPCS | Performed by: INTERNAL MEDICINE

## 2020-11-17 PROCEDURE — G8484 FLU IMMUNIZE NO ADMIN: HCPCS | Performed by: INTERNAL MEDICINE

## 2020-11-17 PROCEDURE — 99204 OFFICE O/P NEW MOD 45 MIN: CPT | Performed by: INTERNAL MEDICINE

## 2020-11-17 PROCEDURE — G8427 DOCREV CUR MEDS BY ELIG CLIN: HCPCS | Performed by: INTERNAL MEDICINE

## 2020-11-17 RX ORDER — METRONIDAZOLE 500 MG/1
500 TABLET ORAL 3 TIMES DAILY
COMMUNITY

## 2020-11-17 RX ORDER — OMEPRAZOLE 40 MG/1
40 CAPSULE, DELAYED RELEASE ORAL 2 TIMES DAILY
Qty: 60 CAPSULE | Refills: 11 | Status: SHIPPED | OUTPATIENT
Start: 2020-11-17

## 2020-11-17 RX ORDER — KETOROLAC TROMETHAMINE 15.75 MG/1
SPRAY, METERED NASAL
COMMUNITY

## 2020-11-17 RX ORDER — PREDNISONE 10 MG/1
10 TABLET ORAL DAILY
COMMUNITY
End: 2020-11-17 | Stop reason: ALTCHOICE

## 2020-11-17 RX ORDER — CYCLOBENZAPRINE HCL 10 MG
10 TABLET ORAL 3 TIMES DAILY PRN
COMMUNITY

## 2020-11-17 RX ORDER — GABAPENTIN 300 MG/1
300 CAPSULE ORAL 3 TIMES DAILY
COMMUNITY

## 2020-11-17 RX ORDER — BUDESONIDE, GLYCOPYRROLATE, AND FORMOTEROL FUMARATE 160; 9; 4.8 UG/1; UG/1; UG/1
2 AEROSOL, METERED RESPIRATORY (INHALATION) 2 TIMES DAILY
Qty: 1 INHALER | Refills: 6 | Status: SHIPPED | OUTPATIENT
Start: 2020-11-17

## 2020-11-17 RX ORDER — BUDESONIDE, GLYCOPYRROLATE, AND FORMOTEROL FUMARATE 160; 9; 4.8 UG/1; UG/1; UG/1
2 AEROSOL, METERED RESPIRATORY (INHALATION) 2 TIMES DAILY
COMMUNITY

## 2020-11-17 RX ORDER — HYDROCODONE BITARTRATE AND ACETAMINOPHEN 5; 325 MG/1; MG/1
1 TABLET ORAL 2 TIMES DAILY PRN
COMMUNITY

## 2020-11-17 RX ORDER — ALBUTEROL SULFATE 90 UG/1
2 AEROSOL, METERED RESPIRATORY (INHALATION) EVERY 6 HOURS PRN
Qty: 1 INHALER | Refills: 11 | Status: SHIPPED | OUTPATIENT
Start: 2020-11-17 | End: 2021-11-17

## 2020-11-17 ASSESSMENT — ENCOUNTER SYMPTOMS
SINUS PRESSURE: 0
DIARRHEA: 0
NAUSEA: 0
ABDOMINAL PAIN: 0
CONSTIPATION: 0

## 2020-11-17 NOTE — PROGRESS NOTES
[facility]  Consult Note  MD Genesis Lynn Sidras   YOB: 1980    Date of Visit:  11/17/2020    Assessment/Plan:  1. Shortness of breath  2. Chronic cough  3. Gastroesophageal reflux disease without esophagitis  4. Current smoker  5. Musculoskeletal chest pain    I have reviewed laboratories, medical records and images for this visit  Patient did have recent CT imaging at an outside facility. I cannot see the images but the report documents a fluid-filled distal esophagus. I suspect GERD plays a significant role in the etiology of his cough. Have him start Prilosec 40 mg p.o. twice daily  Nothing to eat or drink 3 hours before bedtime  Sleep with the head of the bed elevated  Certainly could have a component of obstructive lung disease given his smoking history  However, PFTs at this point are unlikely to be useful given that he is coughing and having significant musculoskeletal chest pain. He is taking Breztri and thinks that has helped his cough a little bit. I gave him a new prescription for this  Also give him a prescription for albuterol was he describes intermittent wheezing. We will consider getting a PFT later once symptoms are better controlled  For his musculoskeletal chest pain I recommended heating pad, external bracing when he is coughing and ibuprofen. 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 I think he would benefit from light duty while we get this all taken care of. Follow-up in about 1 month      Chief Complaint   Patient presents with    Shortness of Breath     started 2 months ago. Been treated with Prednisone and Levaquin    Cough     has been coughing so much and so hard his back,sides, and stomach hurt       HPI  The patient is referred for evaluation of the chief complaint of moderate shortness of breath and moderate to severe short associated cough of about 1 months duration.   The patient has had multiple emergency room visits related to these complaints. He has been treated with steroids, antibiotics and inhaled medication. He reports multiple negative Covid test.  He denies prior history of asthma or breathing problems. He is a smoker. At one time he was a pretty heavy smoker. He is cut back to about a half a pack per day or less now but is having trouble completely stopping. He does not vape. He does work in a warehouse and has a physical job. He describes his chest pain is musculoskeletal in nature. It hurts if he leans against his back or someone presses on it. It also hurts to twist or move. It hurts to cough or take a deep breath. Taking a deep breath does lead to coughing. He describes the cough as severe but nonproductive. He denies sinus drainage. However, he admits to significant GERD symptoms. He is not taking any medication for this. Review of Systems  Review of Systems   Constitutional: Negative for fatigue and fever. HENT: Negative for congestion and sinus pressure. Eyes: Negative for visual disturbance. Cardiovascular: Negative for chest pain and palpitations. Gastrointestinal: Negative for abdominal pain, constipation, diarrhea and nausea. Genitourinary: Negative for difficulty urinating. Musculoskeletal: Negative for arthralgias. Skin: Negative for rash. Neurological: Negative for dizziness and light-headedness. Hematological: Does not bruise/bleed easily. Psychiatric/Behavioral: Negative for behavioral problems. History  I have reviewed past medical, surgical, social and family history. This is documented elsewhere in themedical record. Physical Exam:  Physical Exam  Constitutional:       General: He is not in acute distress. Appearance: He is well-developed. Eyes:      General: No scleral icterus. Neck:      Musculoskeletal: Neck supple. Thyroid: No thyromegaly (No other masses noted in the neck. ). Vascular: No JVD. Trachea: No tracheal deviation. Cardiovascular:      Rate and Rhythm: Normal rate and regular rhythm. Heart sounds: Normal heart sounds. No murmur. Pulmonary:      Effort: Pulmonary effort is normal. No respiratory distress. Breath sounds: Normal breath sounds. No wheezing or rales. Chest:      Chest wall: No tenderness. Abdominal:      General: There is no distension. Palpations: Abdomen is soft. There is no mass (There is no hepatosplenomegaly. ). Tenderness: There is no abdominal tenderness. Lymphadenopathy:      Cervical: No cervical adenopathy. Skin:     General: Skin is warm and dry. Neurological:      Mental Status: He is alert and oriented to person, place, and time. No Known Allergies  Prior to Visit Medications    Medication Sig Taking? Authorizing Provider   Budeson-Glycopyrrol-Formoterol (BREZTRI AEROSPHERE) 160-9-4.8 MCG/ACT AERO Inhale 2 puffs into the lungs 2 times daily Yes Historical Provider, MD   cyclobenzaprine (FLEXERIL) 10 MG tablet Take 10 mg by mouth 3 times daily as needed for Muscle spasms Yes Historical Provider, MD   gabapentin (NEURONTIN) 300 MG capsule Take 300 mg by mouth 3 times daily. Yes Historical Provider, MD   metroNIDAZOLE (FLAGYL) 500 MG tablet Take 500 mg by mouth 3 times daily Yes Historical Provider, MD   HYDROcodone-acetaminophen (NORCO) 5-325 MG per tablet Take 1 tablet by mouth 2 times daily as needed for Pain.  Yes Historical Provider, MD   Ketorolac Tromethamine (SPRIX) 15.75 MG/SPRAY SOLN by Nasal route Yes Historical Provider, MD   albuterol sulfate  (90 Base) MCG/ACT inhaler Inhale 2 puffs into the lungs every 6 hours as needed for Wheezing Yes Pineda Krishnan MD   omeprazole (PRILOSEC) 40 MG delayed release capsule Take 1 capsule by mouth 2 times daily Yes Pineda Krishnan MD   Budeson-Glycopyrrol-Formoterol (BREZTRI AEROSPHERE) 160-9-4.8 MCG/ACT AERO Inhale 2 puffs into the lungs 2 times daily Yes Omaira Tanner MD [place]   hydrocortisone (ANUSOL-HC) 2.5 % rectal cream Place rectally 2 times daily. Yes JOAN Garza   naproxen (NAPROSYN) 500 MG tablet Take 1 tablet by mouth 2 times daily (with meals) Yes JOAN Garza   ibuprofen (ADVIL;MOTRIN) 600 MG tablet Take 1 tablet by mouth every 6 hours as needed for Pain Yes Mary Ann Ledezma PA-C       Vitals:    11/17/20 0923   BP: 138/88   Pulse: 79   SpO2: 98%     There is no height or weight on file to calculate BMI.      Wt Readings from Last 3 Encounters:   06/11/19 235 lb (106.6 kg)   03/21/17 215 lb (97.5 kg)   02/15/17 215 lb (97.5 kg)     BP Readings from Last 3 Encounters:   11/17/20 138/88   10/05/20 114/74   05/20/20 (!) 188/95        Social History     Tobacco Use   Smoking Status Current Every Day Smoker    Packs/day: 1.00    Years: 23.00    Pack years: 23.00    Types: Cigarettes   Smokeless Tobacco Never Used

## 2021-08-19 ENCOUNTER — HOSPITAL ENCOUNTER (EMERGENCY)
Age: 41
Discharge: HOME | End: 2021-08-20

## 2021-08-19 VITALS
WEIGHT: 240 LBS | RESPIRATION RATE: 16 BRPM | TEMPERATURE: 98.6 F | SYSTOLIC BLOOD PRESSURE: 125 MMHG | DIASTOLIC BLOOD PRESSURE: 73 MMHG | OXYGEN SATURATION: 98 % | HEIGHT: 69 IN | HEART RATE: 89 BPM | BODY MASS INDEX: 35.55 KG/M2

## 2021-08-19 DIAGNOSIS — R36.9 PENILE DISCHARGE: Primary | ICD-10-CM

## 2021-08-19 LAB
BILIRUBIN URINE: NEGATIVE
BLOOD, URINE: NEGATIVE
CLARITY: CLEAR
COLOR: YELLOW
EPITHELIAL CELLS, UA: 0 /HPF (ref 0–5)
GLUCOSE URINE: NEGATIVE MG/DL
HYALINE CASTS: 2 /LPF (ref 0–8)
KETONES, URINE: NEGATIVE MG/DL
LEUKOCYTE ESTERASE, URINE: ABNORMAL
MICROSCOPIC EXAMINATION: YES
NITRITE, URINE: NEGATIVE
PH UA: 5.5 (ref 5–8)
PROTEIN UA: NEGATIVE MG/DL
RBC UA: 1 /HPF (ref 0–4)
SPECIFIC GRAVITY UA: 1.02 (ref 1–1.03)
URINE REFLEX TO CULTURE: YES
URINE TRICHOMONAS EVALUATION: NORMAL
URINE TYPE: ABNORMAL
UROBILINOGEN, URINE: 0.2 E.U./DL
WBC UA: 49 /HPF (ref 0–5)

## 2021-08-19 PROCEDURE — 87591 N.GONORRHOEAE DNA AMP PROB: CPT

## 2021-08-19 PROCEDURE — 87086 URINE CULTURE/COLONY COUNT: CPT

## 2021-08-19 PROCEDURE — 81001 URINALYSIS AUTO W/SCOPE: CPT

## 2021-08-19 PROCEDURE — 96372 THER/PROPH/DIAG INJ SC/IM: CPT

## 2021-08-19 PROCEDURE — 87491 CHLMYD TRACH DNA AMP PROBE: CPT

## 2021-08-19 PROCEDURE — 99283 EMERGENCY DEPT VISIT LOW MDM: CPT

## 2021-08-20 LAB
C. TRACHOMATIS DNA ,URINE: NEGATIVE
N. GONORRHOEAE DNA, URINE: NEGATIVE
URINE CULTURE, ROUTINE: NORMAL

## 2021-08-20 PROCEDURE — 6370000000 HC RX 637 (ALT 250 FOR IP): Performed by: PHYSICIAN ASSISTANT

## 2021-08-20 PROCEDURE — 6360000002 HC RX W HCPCS: Performed by: PHYSICIAN ASSISTANT

## 2021-08-20 RX ORDER — DOXYCYCLINE HYCLATE 100 MG
100 TABLET ORAL ONCE
Status: COMPLETED | OUTPATIENT
Start: 2021-08-20 | End: 2021-08-20

## 2021-08-20 RX ORDER — CEFTRIAXONE 1 G/1
500 INJECTION, POWDER, FOR SOLUTION INTRAMUSCULAR; INTRAVENOUS ONCE
Status: COMPLETED | OUTPATIENT
Start: 2021-08-20 | End: 2021-08-20

## 2021-08-20 RX ORDER — DOXYCYCLINE 100 MG/1
100 TABLET ORAL 2 TIMES DAILY
Qty: 14 TABLET | Refills: 0 | Status: SHIPPED | OUTPATIENT
Start: 2021-08-20 | End: 2021-08-27

## 2021-08-20 RX ORDER — METRONIDAZOLE 250 MG/1
2000 TABLET ORAL ONCE
Status: COMPLETED | OUTPATIENT
Start: 2021-08-20 | End: 2021-08-20

## 2021-08-20 RX ADMIN — DOXYCYCLINE HYCLATE 100 MG: 100 TABLET, COATED ORAL at 00:47

## 2021-08-20 RX ADMIN — CEFTRIAXONE SODIUM 500 MG: 1 INJECTION, POWDER, FOR SOLUTION INTRAMUSCULAR; INTRAVENOUS at 00:47

## 2021-08-20 RX ADMIN — METRONIDAZOLE 2000 MG: 250 TABLET ORAL at 00:47

## 2021-08-20 ASSESSMENT — ENCOUNTER SYMPTOMS
NAUSEA: 0
SHORTNESS OF BREATH: 0
ABDOMINAL PAIN: 0
VOMITING: 0

## 2022-06-14 ENCOUNTER — HOSPITAL ENCOUNTER (EMERGENCY)
Age: 42
Discharge: HOME | End: 2022-06-14
Attending: EMERGENCY MEDICINE

## 2022-06-14 ENCOUNTER — APPOINTMENT (OUTPATIENT)
Dept: GENERAL RADIOLOGY | Age: 42
End: 2022-06-14

## 2022-06-14 VITALS
TEMPERATURE: 98.6 F | DIASTOLIC BLOOD PRESSURE: 77 MMHG | HEART RATE: 72 BPM | RESPIRATION RATE: 10 BRPM | BODY MASS INDEX: 33.53 KG/M2 | OXYGEN SATURATION: 100 % | SYSTOLIC BLOOD PRESSURE: 137 MMHG | HEIGHT: 69 IN | WEIGHT: 226.4 LBS

## 2022-06-14 DIAGNOSIS — S60.221A CONTUSION OF RIGHT HAND, INITIAL ENCOUNTER: Primary | ICD-10-CM

## 2022-06-14 DIAGNOSIS — S63.501A SPRAIN OF RIGHT WRIST, INITIAL ENCOUNTER: ICD-10-CM

## 2022-06-14 PROCEDURE — 73110 X-RAY EXAM OF WRIST: CPT

## 2022-06-14 PROCEDURE — 99283 EMERGENCY DEPT VISIT LOW MDM: CPT

## 2022-06-14 PROCEDURE — 6370000000 HC RX 637 (ALT 250 FOR IP): Performed by: EMERGENCY MEDICINE

## 2022-06-14 PROCEDURE — 73130 X-RAY EXAM OF HAND: CPT

## 2022-06-14 RX ORDER — NAPROXEN 500 MG/1
500 TABLET ORAL ONCE
Status: COMPLETED | OUTPATIENT
Start: 2022-06-14 | End: 2022-06-14

## 2022-06-14 RX ORDER — CEPHALEXIN 500 MG/1
500 CAPSULE ORAL 4 TIMES DAILY
Qty: 28 CAPSULE | Refills: 0 | Status: SHIPPED | OUTPATIENT
Start: 2022-06-14 | End: 2022-06-21

## 2022-06-14 RX ORDER — NAPROXEN 500 MG/1
500 TABLET ORAL 2 TIMES DAILY
Qty: 20 TABLET | Refills: 0 | Status: SHIPPED | OUTPATIENT
Start: 2022-06-14 | End: 2022-06-24

## 2022-06-14 RX ADMIN — Medication 500 MG: at 21:33

## 2022-06-15 NOTE — ED PROVIDER NOTES
[address] [facility]  eMERGENCY dEPARTMENT eNCOUnter      Pt Name: Janey Polk  MRN: [id number]  [place] 1980  Date of evaluation: 6/14/2022  Provider: Ramonita Rojas MD  PCP: Lisandra Leigh MD      CHIEF COMPLAINT       Right hand injury    HISTORY OFPRESENT ILLNESS   (Location/Symptom, Timing/Onset, Context/Setting, Quality, Duration, Modifying Factors,Severity)  Note limiting factors. Janey Polk is a 40 y.o. male was angry and punched a wall over the weekend and now is complaining of right hand and wrist pain he rates that pain at about an 8 out of 10    Nursing Notes were all reviewed and agreed with or any disagreements were addressed  in the HPI. REVIEW OF SYSTEMS    (2-9 systems for level 4, 10 or more for level 5)     Review of Systems    Positives and Pertinent negatives as per HPI. Except as noted above in the ROS, all other systems were reviewed andnegative. PASTMEDICAL HISTORY     Past Medical History:   Diagnosis Date    Asthmatic bronchitis     Back pain     Lumbar disc displacement without myelopathy     Neisseria gonorrheae 6/13/15         SURGICAL HISTORY     No past surgical history on file. CURRENT MEDICATIONS       Previous Medications    ALBUTEROL SULFATE  (90 BASE) MCG/ACT INHALER    Inhale 2 puffs into the lungs every 6 hours as needed for Wheezing    BUDESON-GLYCOPYRROL-FORMOTEROL (BREZTRI AEROSPHERE) 160-9-4.8 MCG/ACT AERO    Inhale 2 puffs into the lungs 2 times daily    BUDESON-GLYCOPYRROL-FORMOTEROL (BREZTRI AEROSPHERE) 160-9-4.8 MCG/ACT AERO    Inhale 2 puffs into the lungs 2 times daily    CYCLOBENZAPRINE (FLEXERIL) 10 MG TABLET    Take 10 mg by mouth 3 times daily as needed for Muscle spasms    GABAPENTIN (NEURONTIN) 300 MG CAPSULE    Take 300 mg by mouth 3 times daily. HYDROCODONE-ACETAMINOPHEN (NORCO) 5-325 MG PER TABLET    Take 1 tablet by mouth 2 times daily as needed for Pain.     HYDROCORTISONE (ANUSOL-HC) 2.5 % RECTAL CREAM    Place rectally 2 times daily. IBUPROFEN (ADVIL;MOTRIN) 600 MG TABLET    Take 1 tablet by mouth every 6 hours as needed for Pain    KETOROLAC TROMETHAMINE (SPRIX) 15.75 MG/SPRAY SOLN    by Nasal route    METRONIDAZOLE (FLAGYL) 500 MG TABLET    Take 500 mg by mouth 3 times daily    NAPROXEN (NAPROSYN) 500 MG TABLET    Take 1 tablet by mouth 2 times daily (with meals)    OMEPRAZOLE (PRILOSEC) 40 MG DELAYED RELEASE CAPSULE    Take 1 capsule by mouth 2 times daily       ALLERGIES     Patient has no known allergies. FAMILY HISTORY     No family history on file. SOCIAL HISTORY       Social History     Socioeconomic History    Marital status: Single     Spouse name: Not on file    Number of children: Not on file    Years of education: Not on file    Highest education level: Not on file   Occupational History    Not on file   Tobacco Use    Smoking status: Current Every Day Smoker     Packs/day: 1.00     Years: 23.00     Pack years: 23.00     Types: Cigarettes    Smokeless tobacco: Never Used   Vaping Use    Vaping Use: Never used   Substance and Sexual Activity    Alcohol use: No     Comment: Rare    Drug use: No    Sexual activity: Not on file   Other Topics Concern    Not on file   Social History Narrative    ** Merged History Encounter **          Social Determinants of Health     Financial Resource Strain:     Difficulty of Paying Living Expenses: Not on file   Food Insecurity:     Worried About Running Out of Food in the Last Year: Not on file    Guilherme of Food in the Last Year: Not on file   Transportation Needs:     Lack of Transportation (Medical): Not on file    Lack of Transportation (Non-Medical):  Not on file   Physical Activity:     Days of Exercise per Week: Not on file    Minutes of Exercise per Session: Not on file   Stress:     Feeling of Stress : Not on file   Social Connections:     Frequency of Communication with Friends and Family: Not on file    Frequency of Social Gatherings with Friends and Family: Not on file    Attends Latter-day Services: Not on file    Active Member of Clubs or Organizations: Not on file    Attends Club or Organization Meetings: Not on file    Marital Status: Not on file   Intimate Partner Violence:     Fear of Current or Ex-Partner: Not on file    Emotionally Abused: Not on file    Physically Abused: Not on file    Sexually Abused: Not on file   Housing Stability:     Unable to Pay for Housing in the Last Year: Not on file    Number of [place] in the Last Year: Not on file    Unstable Housing in the Last Year: Not on file       SCREENINGS      @FLOW([id number])@      PHYSICAL EXAM    (up to 7 for level 4, 8 or more for level 5)     ED Triage Vitals [06/14/22 2040]   BP Temp Temp Source Heart Rate Resp SpO2 Height Weight   137/77 98.6 °F (37 °C) Oral 72 10 100 % 5' 9\" (1.753 m) 226 lb 6.4 oz (102.7 kg)       Physical Exam      General Appearance:  Alert, cooperative, no distress, appears stated age. Head:  Normocephalic, without obviousabnormality, atraumatic. Eyes:  conjunctiva/corneas clear, EOM's intact. Sclera anicteric. ENT: Mucous membranes moist.   Neck: Supple, symmetrical, trachea midline, no adenopathy. No jugular venous distention. Lungs:   Clear to auscultation bilaterally, respirationsunlabored. No rales, rhonchi or wheezes. Chest Wall:  No tenderness. Heart:  Regular rate and rhythm, S1 and S2 normal, no murmur, rub or gallop. Abdomen:   Soft, non-tender, bowel sounds active,   no masses, no organomegaly. Extremities: No edema, cords or calf tenderness. Swelling to the fourth and fifth fingers as well as the fourth and fifth metacarpals the wrist does not appear to be swollen there is no tenderness to the medial lateral styloid there is no tenderness to the snuffbox   Pulses: 2+ and symmetric   Skin: Turgor is normal, no rashes or lesions.    Neurologic: Alert and oriented X 3.No focal findings. Motor grossly normal.  Speech clear, no drift, CN III-XII grossly intact,        DIAGNOSTIC RESULTS   LABS:    Labs Reviewed - No data to display    All other labs were within normal range or not returned as of this dictation. EKG: All EKG's are interpreted by the Emergency Department Physician who eithersigns or Co-signs this chart in the absence of a cardiologist.        RADIOLOGY:   Non-plain film images such as CT, Ultrasound and MRI are read by the radiologist. Plain radiographic images are visualized by myself. *    Interpretation per the Radiologist below, if available at the time of this note:    XR HAND RIGHT (MIN 3 VIEWS)   Final Result      Right hand: No acute osseous abnormality. Right wrist: No acute osseous abnormality. XR WRIST RIGHT (MIN 3 VIEWS)   Final Result      Right hand: No acute osseous abnormality. Right wrist: No acute osseous abnormality. PROCEDURES   Unless otherwise noted below, none     Procedures    *    CRITICAL CARE TIME   N/A      EMERGENCY DEPARTMENT COURSE and DIFFERENTIALDIAGNOSIS/MDM:   Vitals:    Vitals:    06/14/22 2040   BP: 137/77   Pulse: 72   Resp: 10   Temp: 98.6 °F (37 °C)   TempSrc: Oral   SpO2: 100%   Weight: 226 lb 6.4 oz (102.7 kg)   Height: 5' 9\" (1.753 m)       Patient was given thefollowing medications:  Medications   naproxen (NAPROSYN) tablet 500 mg (has no administration in time range)           The patient tolerated their visit well. The patient and / or the familywere informed of the results of any tests, a time was given to answer questions. FINAL IMPRESSION      1. Contusion of right hand, initial encounter    2.  Sprain of right wrist, initial encounter          DISPOSITION/PLAN   DISPOSITION Discharge - Pending Orders Complete 06/14/2022 09:19:10 PM    Plan will be for discharge follow-up with orthopedics hand and a wrist splint  PATIENT REFERRED TO:  Kei Healy, [address]    In 2 days  As needed      DISCHARGE MEDICATIONS:  New Prescriptions    CEPHALEXIN (KEFLEX) 500 MG CAPSULE    Take 1 capsule by mouth 4 times daily for 7 days    NAPROXEN (NAPROSYN) 500 MG TABLET    Take 1 tablet by mouth 2 times daily for 20 doses       DISCONTINUED MEDICATIONS:  Discontinued Medications    No medications on file              (Please note that portions of this note were completed with a voice recognition program.  Efforts were made to edit the dictations but occasionally words are mis-transcribed.)    Kai Mosley MD (electronically signed)      Kai Mosley MD  06/14/22 2850